# Patient Record
Sex: FEMALE | Race: WHITE | NOT HISPANIC OR LATINO
[De-identification: names, ages, dates, MRNs, and addresses within clinical notes are randomized per-mention and may not be internally consistent; named-entity substitution may affect disease eponyms.]

---

## 2017-05-25 ENCOUNTER — APPOINTMENT (OUTPATIENT)
Dept: MAMMOGRAPHY | Facility: IMAGING CENTER | Age: 47
End: 2017-05-25

## 2017-05-25 ENCOUNTER — APPOINTMENT (OUTPATIENT)
Dept: ULTRASOUND IMAGING | Facility: IMAGING CENTER | Age: 47
End: 2017-05-25

## 2017-05-25 ENCOUNTER — OUTPATIENT (OUTPATIENT)
Dept: OUTPATIENT SERVICES | Facility: HOSPITAL | Age: 47
LOS: 1 days | End: 2017-05-25
Payer: COMMERCIAL

## 2017-05-25 DIAGNOSIS — Z00.00 ENCOUNTER FOR GENERAL ADULT MEDICAL EXAMINATION WITHOUT ABNORMAL FINDINGS: ICD-10-CM

## 2017-05-25 PROCEDURE — 76641 ULTRASOUND BREAST COMPLETE: CPT

## 2017-05-25 PROCEDURE — 77063 BREAST TOMOSYNTHESIS BI: CPT

## 2017-05-25 PROCEDURE — 77067 SCR MAMMO BI INCL CAD: CPT

## 2018-05-31 ENCOUNTER — APPOINTMENT (OUTPATIENT)
Dept: ULTRASOUND IMAGING | Facility: IMAGING CENTER | Age: 48
End: 2018-05-31
Payer: COMMERCIAL

## 2018-05-31 ENCOUNTER — OUTPATIENT (OUTPATIENT)
Dept: OUTPATIENT SERVICES | Facility: HOSPITAL | Age: 48
LOS: 1 days | End: 2018-05-31
Payer: COMMERCIAL

## 2018-05-31 ENCOUNTER — APPOINTMENT (OUTPATIENT)
Dept: MAMMOGRAPHY | Facility: IMAGING CENTER | Age: 48
End: 2018-05-31
Payer: COMMERCIAL

## 2018-05-31 DIAGNOSIS — Z00.8 ENCOUNTER FOR OTHER GENERAL EXAMINATION: ICD-10-CM

## 2018-05-31 PROCEDURE — 77063 BREAST TOMOSYNTHESIS BI: CPT

## 2018-05-31 PROCEDURE — 77067 SCR MAMMO BI INCL CAD: CPT

## 2018-05-31 PROCEDURE — 77063 BREAST TOMOSYNTHESIS BI: CPT | Mod: 26

## 2018-05-31 PROCEDURE — 76641 ULTRASOUND BREAST COMPLETE: CPT

## 2018-05-31 PROCEDURE — 76641 ULTRASOUND BREAST COMPLETE: CPT | Mod: 26,50

## 2018-05-31 PROCEDURE — 77067 SCR MAMMO BI INCL CAD: CPT | Mod: 26

## 2019-06-06 ENCOUNTER — APPOINTMENT (OUTPATIENT)
Dept: ULTRASOUND IMAGING | Facility: IMAGING CENTER | Age: 49
End: 2019-06-06
Payer: COMMERCIAL

## 2019-06-06 ENCOUNTER — OUTPATIENT (OUTPATIENT)
Dept: OUTPATIENT SERVICES | Facility: HOSPITAL | Age: 49
LOS: 1 days | End: 2019-06-06
Payer: COMMERCIAL

## 2019-06-06 ENCOUNTER — APPOINTMENT (OUTPATIENT)
Dept: MAMMOGRAPHY | Facility: IMAGING CENTER | Age: 49
End: 2019-06-06
Payer: COMMERCIAL

## 2019-06-06 DIAGNOSIS — R92.8 OTHER ABNORMAL AND INCONCLUSIVE FINDINGS ON DIAGNOSTIC IMAGING OF BREAST: ICD-10-CM

## 2019-06-06 DIAGNOSIS — Z12.31 ENCOUNTER FOR SCREENING MAMMOGRAM FOR MALIGNANT NEOPLASM OF BREAST: ICD-10-CM

## 2019-06-06 PROCEDURE — 77063 BREAST TOMOSYNTHESIS BI: CPT | Mod: 26

## 2019-06-06 PROCEDURE — 77067 SCR MAMMO BI INCL CAD: CPT | Mod: 26

## 2019-06-06 PROCEDURE — 77063 BREAST TOMOSYNTHESIS BI: CPT

## 2019-06-06 PROCEDURE — 76641 ULTRASOUND BREAST COMPLETE: CPT | Mod: 26,50

## 2019-06-06 PROCEDURE — 76641 ULTRASOUND BREAST COMPLETE: CPT

## 2019-06-06 PROCEDURE — 77067 SCR MAMMO BI INCL CAD: CPT

## 2020-08-06 ENCOUNTER — APPOINTMENT (OUTPATIENT)
Dept: ULTRASOUND IMAGING | Facility: IMAGING CENTER | Age: 50
End: 2020-08-06
Payer: COMMERCIAL

## 2020-08-06 ENCOUNTER — APPOINTMENT (OUTPATIENT)
Dept: MAMMOGRAPHY | Facility: IMAGING CENTER | Age: 50
End: 2020-08-06
Payer: COMMERCIAL

## 2020-08-06 ENCOUNTER — OUTPATIENT (OUTPATIENT)
Dept: OUTPATIENT SERVICES | Facility: HOSPITAL | Age: 50
LOS: 1 days | End: 2020-08-06
Payer: COMMERCIAL

## 2020-08-06 DIAGNOSIS — Z12.31 ENCOUNTER FOR SCREENING MAMMOGRAM FOR MALIGNANT NEOPLASM OF BREAST: ICD-10-CM

## 2020-08-06 PROCEDURE — 76641 ULTRASOUND BREAST COMPLETE: CPT | Mod: 26,50

## 2020-08-06 PROCEDURE — 77063 BREAST TOMOSYNTHESIS BI: CPT | Mod: 26

## 2020-08-06 PROCEDURE — 77067 SCR MAMMO BI INCL CAD: CPT

## 2020-08-06 PROCEDURE — 77063 BREAST TOMOSYNTHESIS BI: CPT

## 2020-08-06 PROCEDURE — 77067 SCR MAMMO BI INCL CAD: CPT | Mod: 26

## 2020-08-06 PROCEDURE — 76641 ULTRASOUND BREAST COMPLETE: CPT

## 2021-08-24 ENCOUNTER — APPOINTMENT (OUTPATIENT)
Dept: GASTROENTEROLOGY | Facility: CLINIC | Age: 51
End: 2021-08-24
Payer: COMMERCIAL

## 2021-08-24 VITALS
BODY MASS INDEX: 18.25 KG/M2 | OXYGEN SATURATION: 98 % | HEIGHT: 63 IN | TEMPERATURE: 98 F | SYSTOLIC BLOOD PRESSURE: 100 MMHG | HEART RATE: 73 BPM | WEIGHT: 103 LBS | DIASTOLIC BLOOD PRESSURE: 60 MMHG

## 2021-08-24 DIAGNOSIS — Z00.00 ENCOUNTER FOR GENERAL ADULT MEDICAL EXAMINATION W/OUT ABNORMAL FINDINGS: ICD-10-CM

## 2021-08-24 PROCEDURE — 99203 OFFICE O/P NEW LOW 30 MIN: CPT

## 2021-08-24 RX ORDER — SODIUM PICOSULFATE, MAGNESIUM OXIDE, AND ANHYDROUS CITRIC ACID 10; 3.5; 12 MG/160ML; G/160ML; G/160ML
10-3.5-12 MG-GM LIQUID ORAL
Qty: 1 | Refills: 0 | Status: ACTIVE | COMMUNITY
Start: 2021-08-24 | End: 1900-01-01

## 2021-08-27 DIAGNOSIS — Z01.818 ENCOUNTER FOR OTHER PREPROCEDURAL EXAMINATION: ICD-10-CM

## 2021-08-28 ENCOUNTER — APPOINTMENT (OUTPATIENT)
Dept: DISASTER EMERGENCY | Facility: CLINIC | Age: 51
End: 2021-08-28

## 2021-08-30 LAB — SARS-COV-2 N GENE NPH QL NAA+PROBE: NOT DETECTED

## 2021-09-01 ENCOUNTER — LABORATORY RESULT (OUTPATIENT)
Age: 51
End: 2021-09-01

## 2021-09-01 ENCOUNTER — APPOINTMENT (OUTPATIENT)
Dept: GASTROENTEROLOGY | Facility: CLINIC | Age: 51
End: 2021-09-01
Payer: COMMERCIAL

## 2021-09-01 LAB
HCG UR QL: NEGATIVE
QUALITY CONTROL: YES

## 2021-09-01 PROCEDURE — 45380 COLONOSCOPY AND BIOPSY: CPT | Mod: 33

## 2021-09-01 PROCEDURE — 81025 URINE PREGNANCY TEST: CPT

## 2021-09-23 ENCOUNTER — APPOINTMENT (OUTPATIENT)
Dept: ULTRASOUND IMAGING | Facility: IMAGING CENTER | Age: 51
End: 2021-09-23
Payer: COMMERCIAL

## 2021-09-23 ENCOUNTER — OUTPATIENT (OUTPATIENT)
Dept: OUTPATIENT SERVICES | Facility: HOSPITAL | Age: 51
LOS: 1 days | End: 2021-09-23
Payer: COMMERCIAL

## 2021-09-23 ENCOUNTER — APPOINTMENT (OUTPATIENT)
Dept: MAMMOGRAPHY | Facility: IMAGING CENTER | Age: 51
End: 2021-09-23
Payer: COMMERCIAL

## 2021-09-23 DIAGNOSIS — Z00.8 ENCOUNTER FOR OTHER GENERAL EXAMINATION: ICD-10-CM

## 2021-09-23 DIAGNOSIS — Z12.31 ENCOUNTER FOR SCREENING MAMMOGRAM FOR MALIGNANT NEOPLASM OF BREAST: ICD-10-CM

## 2021-09-23 PROCEDURE — 77063 BREAST TOMOSYNTHESIS BI: CPT

## 2021-09-23 PROCEDURE — 76641 ULTRASOUND BREAST COMPLETE: CPT

## 2021-09-23 PROCEDURE — 77067 SCR MAMMO BI INCL CAD: CPT | Mod: 26

## 2021-09-23 PROCEDURE — 77063 BREAST TOMOSYNTHESIS BI: CPT | Mod: 26

## 2021-09-23 PROCEDURE — 76641 ULTRASOUND BREAST COMPLETE: CPT | Mod: 26,50

## 2021-09-23 PROCEDURE — 77067 SCR MAMMO BI INCL CAD: CPT

## 2022-04-07 ENCOUNTER — LABORATORY RESULT (OUTPATIENT)
Age: 52
End: 2022-04-07

## 2022-04-07 ENCOUNTER — NON-APPOINTMENT (OUTPATIENT)
Age: 52
End: 2022-04-07

## 2022-04-07 ENCOUNTER — APPOINTMENT (OUTPATIENT)
Dept: PULMONOLOGY | Facility: CLINIC | Age: 52
End: 2022-04-07
Payer: COMMERCIAL

## 2022-04-07 VITALS
HEIGHT: 63 IN | OXYGEN SATURATION: 98 % | RESPIRATION RATE: 16 BRPM | TEMPERATURE: 98.5 F | HEART RATE: 69 BPM | DIASTOLIC BLOOD PRESSURE: 69 MMHG | WEIGHT: 103 LBS | BODY MASS INDEX: 18.25 KG/M2 | SYSTOLIC BLOOD PRESSURE: 102 MMHG

## 2022-04-07 DIAGNOSIS — Z78.9 OTHER SPECIFIED HEALTH STATUS: ICD-10-CM

## 2022-04-07 DIAGNOSIS — Z11.59 ENCOUNTER FOR SCREENING FOR OTHER VIRAL DISEASES: ICD-10-CM

## 2022-04-07 LAB — POCT - HEMOGLOBIN (HGB), QUANTITATIVE, TRANSCUTANEOUS: 13.2

## 2022-04-07 PROCEDURE — 94729 DIFFUSING CAPACITY: CPT

## 2022-04-07 PROCEDURE — 94010 BREATHING CAPACITY TEST: CPT

## 2022-04-07 PROCEDURE — 36415 COLL VENOUS BLD VENIPUNCTURE: CPT

## 2022-04-07 PROCEDURE — ZZZZZ: CPT | Mod: 1L

## 2022-04-07 PROCEDURE — 94727 GAS DIL/WSHOT DETER LNG VOL: CPT

## 2022-04-07 PROCEDURE — 88738 HGB QUANT TRANSCUTANEOUS: CPT

## 2022-04-07 PROCEDURE — 99205 OFFICE O/P NEW HI 60 MIN: CPT | Mod: 25

## 2022-04-07 RX ORDER — ALBUTEROL SULFATE 90 UG/1
108 (90 BASE) INHALANT RESPIRATORY (INHALATION)
Qty: 1 | Refills: 1 | Status: ACTIVE | COMMUNITY
Start: 2022-04-07 | End: 1900-01-01

## 2022-04-07 NOTE — HISTORY OF PRESENT ILLNESS
[Never] : never [TextBox_4] : 51-year-old female pulmonary evaluation\par Referral to ENT\par Chief complaint 2 to 3 weeks of chronic persistent cough\par Was seen by ENT told that she had an abnormal lung exam refer for chest x-ray which officially reported hyper aeration\par Was treated with a Z-Mikael and Flonase\par \par Cough has persisted\par She states the cough is most noted when she is talking or when she is exhaling and breathing\par There is associated cough related to cold weather symptomatology as well\par She does not report any nocturnal symptomatology or awakening with cough\par States her son was sick several weeks ago with cold-like symptoms but reports home rapid antigen Covid testing all negative\par Patient states she works in [employer] and has had rapid Covid 2 times per week and they remain negative as well\par She has received COVID vaccine protocol\par She also states in the past having a cough that would linger and persist\par She does not report any wheezing\par Cough is mostly dry she does not report any associated sputum production hemoptysis purulent sputum or foul-smelling sputum\par She is not complaining of any exertional dyspnea\par She states as a child she has had episodes of bronchitis\par Does not report history of eczema\par History of chemical toxic inhalational exposures\par Non-smoker\par

## 2022-04-07 NOTE — PROCEDURE
[FreeTextEntry1] : PFT no bronchodilator April 7, 2022\par Flow rates are normal\par FEV1 FVC 77 borderline\par Lung vitals are normal\par % predicted subtle measurement for increased hyper aeration air trapping\par RV/total lung capacity 80 normal range\par Diffusion normal 90% predicted\par Hemoglobin 13.2\par \par [facility] April 1, 2022 hyper aeration of the lungs\par \par blood draw

## 2022-04-07 NOTE — PHYSICAL EXAM
[Normal Oropharynx] : normal oropharynx [I] : Mallampati Class: I [Normal Appearance] : normal appearance [Supple] : supple [No Neck Mass] : no neck mass [No JVD] : no jvd [Normal Rate/Rhythm] : normal rate/rhythm [Normal S1, S2] : normal s1, s2 [No Murmurs] : no murmurs [No Resp Distress] : no resp distress [No Acc Muscle Use] : no acc muscle use [Normal Palpation] : normal palpation [Normal Rhythm and Effort] : normal rhythm and effort [Clear to Auscultation Bilaterally] : clear to auscultation bilaterally [No Abnormalities] : no abnormalities [Benign] : benign [Not Tender] : not tender [Soft] : soft [No HSM] : no hsm [Normal Bowel Sounds] : normal bowel sounds [Normal Gait] : normal gait [No Clubbing] : no clubbing [No Cyanosis] : no cyanosis [No Edema] : no edema [FROM] : FROM [No Acute Distress] : no acute distress [TextBox_68] : Positive talking with cough

## 2022-04-07 NOTE — DISCUSSION/SUMMARY
[FreeTextEntry1] : Subacute cough\par Rule out post viral bronchitic syndrome\par Rule out viral etiology\par Hyperaeration of the lungs other symptomatology cough and exhalation cold weather symptomatology suggestive of inflammatory reaction active airway disease rule out cough variant asthma\par Recommendations\par Asthma profile\par CBC check eosinophils\par BREO 200 mcg 1 puff and Rinse\par prn KENNETH  \par medrol dose florence\par

## 2022-04-08 ENCOUNTER — NON-APPOINTMENT (OUTPATIENT)
Age: 52
End: 2022-04-08

## 2022-04-08 LAB
BASOPHILS # BLD AUTO: 0.06 K/UL
BASOPHILS NFR BLD AUTO: 0.7 %
COVID-19 NUCLEOCAPSID  GAM ANTIBODY INTERPRETATION: NEGATIVE
EOSINOPHIL # BLD AUTO: 0.1 K/UL
EOSINOPHIL NFR BLD AUTO: 1.2 %
HCT VFR BLD CALC: 40.5 %
HGB BLD-MCNC: 13.2 G/DL
IMM GRANULOCYTES NFR BLD AUTO: 0.4 %
LYMPHOCYTES # BLD AUTO: 2.22 K/UL
LYMPHOCYTES NFR BLD AUTO: 26.4 %
MAN DIFF?: NORMAL
MCHC RBC-ENTMCNC: 31.5 PG
MCHC RBC-ENTMCNC: 32.6 GM/DL
MCV RBC AUTO: 96.7 FL
MONOCYTES # BLD AUTO: 0.58 K/UL
MONOCYTES NFR BLD AUTO: 6.9 %
NEUTROPHILS # BLD AUTO: 5.41 K/UL
NEUTROPHILS NFR BLD AUTO: 64.4 %
PLATELET # BLD AUTO: 308 K/UL
RBC # BLD: 4.19 M/UL
RBC # FLD: 12.9 %
SARS-COV-2 AB SERPL QL IA: 0.07 INDEX
WBC # FLD AUTO: 8.4 K/UL

## 2022-04-11 PROBLEM — Z11.59 SCREENING FOR VIRAL DISEASE: Status: ACTIVE | Noted: 2022-04-07

## 2022-04-11 LAB
A ALTERNATA IGE QN: <0.1 KUA/L
A FUMIGATUS IGE QN: <0.1 KUA/L
C ALBICANS IGE QN: <0.1 KUA/L
C HERBARUM IGE QN: <0.1 KUA/L
CAT DANDER IGE QN: <0.1 KUA/L
CLAM IGE QN: <0.1 KUA/L
CODFISH IGE QN: <0.1 KUA/L
COMMON RAGWEED IGE QN: <0.1 KUA/L
CORN IGE QN: <0.1 KUA/L
COW MILK IGE QN: <0.1 KUA/L
D FARINAE IGE QN: <0.1 KUA/L
D PTERONYSS IGE QN: <0.1 KUA/L
DEPRECATED A ALTERNATA IGE RAST QL: 0
DEPRECATED A FUMIGATUS IGE RAST QL: 0
DEPRECATED C ALBICANS IGE RAST QL: 0
DEPRECATED C HERBARUM IGE RAST QL: 0
DEPRECATED CAT DANDER IGE RAST QL: 0
DEPRECATED CLAM IGE RAST QL: 0
DEPRECATED CODFISH IGE RAST QL: 0
DEPRECATED COMMON RAGWEED IGE RAST QL: 0
DEPRECATED CORN IGE RAST QL: 0
DEPRECATED COW MILK IGE RAST QL: 0
DEPRECATED D FARINAE IGE RAST QL: 0
DEPRECATED D PTERONYSS IGE RAST QL: 0
DEPRECATED DOG DANDER IGE RAST QL: 0
DEPRECATED EGG WHITE IGE RAST QL: 0
DEPRECATED M RACEMOSUS IGE RAST QL: 0
DEPRECATED PEANUT IGE RAST QL: 0
DEPRECATED ROACH IGE RAST QL: 0
DEPRECATED SCALLOP IGE RAST QL: <0.1 KUA/L
DEPRECATED SESAME SEED IGE RAST QL: 0
DEPRECATED SHRIMP IGE RAST QL: 0
DEPRECATED SOYBEAN IGE RAST QL: 0
DEPRECATED TIMOTHY IGE RAST QL: 0
DEPRECATED WALNUT IGE RAST QL: 0
DEPRECATED WHEAT IGE RAST QL: 0
DEPRECATED WHITE OAK IGE RAST QL: 0
DOG DANDER IGE QN: <0.1 KUA/L
EGG WHITE IGE QN: <0.1 KUA/L
M RACEMOSUS IGE QN: <0.1 KUA/L
PEANUT IGE QN: <0.1 KUA/L
ROACH IGE QN: <0.1 KUA/L
SCALLOP IGE QN: 0
SCALLOP IGE QN: <0.1 KUA/L
SESAME SEED IGE QN: <0.1 KUA/L
SOYBEAN IGE QN: <0.1 KUA/L
TIMOTHY IGE QN: <0.1 KUA/L
WALNUT IGE QN: <0.1 KUA/L
WHEAT IGE QN: <0.1 KUA/L
WHITE OAK IGE QN: <0.1 KUA/L

## 2022-05-27 ENCOUNTER — APPOINTMENT (OUTPATIENT)
Dept: PULMONOLOGY | Facility: CLINIC | Age: 52
End: 2022-05-27
Payer: COMMERCIAL

## 2022-05-27 VITALS
DIASTOLIC BLOOD PRESSURE: 67 MMHG | SYSTOLIC BLOOD PRESSURE: 107 MMHG | OXYGEN SATURATION: 98 % | TEMPERATURE: 98.2 F | HEART RATE: 68 BPM

## 2022-05-27 PROCEDURE — 99214 OFFICE O/P EST MOD 30 MIN: CPT

## 2022-05-27 RX ORDER — METHYLPREDNISOLONE 4 MG/1
4 TABLET ORAL
Qty: 1 | Refills: 0 | Status: DISCONTINUED | COMMUNITY
Start: 2022-04-07 | End: 2022-05-27

## 2022-05-27 NOTE — PHYSICAL EXAM
[No Acute Distress] : no acute distress [Normal Oropharynx] : normal oropharynx [I] : Mallampati Class: I [Normal Appearance] : normal appearance [Supple] : supple [No Neck Mass] : no neck mass [No JVD] : no jvd [Normal Rate/Rhythm] : normal rate/rhythm [Normal S1, S2] : normal s1, s2 [No Murmurs] : no murmurs [No Resp Distress] : no resp distress [No Acc Muscle Use] : no acc muscle use [Normal Palpation] : normal palpation [Normal Rhythm and Effort] : normal rhythm and effort [Clear to Auscultation Bilaterally] : clear to auscultation bilaterally [No Abnormalities] : no abnormalities [Benign] : benign [Not Tender] : not tender [Soft] : soft [No HSM] : no hsm [Normal Bowel Sounds] : normal bowel sounds [Normal Gait] : normal gait [No Clubbing] : no clubbing [No Cyanosis] : no cyanosis [No Edema] : no edema [FROM] : FROM [TextBox_68] : Positive talking with cough

## 2022-05-27 NOTE — PROCEDURE
[FreeTextEntry1] : PFT no bronchodilator April 7, 2022\par Flow rates are normal\par FEV1 FVC 77 borderline\par Lung vitals are normal\par % predicted subtle measurement for increased hyper aeration air trapping\par RV/total lung capacity 80 normal range\par Diffusion normal 90% predicted\par Hemoglobin 13.2\par \par [facility] April 1, 2022 hyper aeration of the lungs\par \par blood draw

## 2022-05-27 NOTE — HISTORY OF PRESENT ILLNESS
[Never] : never [TextBox_4] : 51-year-old female pulmonary evaluation\par Referral to ENT\par Chief complaint 2 to 3 weeks of chronic persistent cough- resolved  post tx steroids\par Was seen by ENT told that she had an abnormal lung exam refer for chest x-ray which officially reported hyper aeration\par Was treated with a Z-Mikael and Flonase\par \par Cough has persisted as noted better\par She states the cough is most noted when she is talking or when she is exhaling and breathing\par There is associated cough related to cold weather symptomatology as well\par She does not report any nocturnal symptomatology or awakening with cough\par States her son was sick several weeks ago with cold-like symptoms but reports home rapid antigen Covid testing all negative\par Patient states she works in [employer] and has had rapid Covid 2 times per week and they remain negative as well\par She has received COVID vaccine protocol\par She also states in the past having a cough that would linger and persist\par She does not report any wheezing\par Cough is mostly dry she does not report any associated sputum production hemoptysis purulent sputum or foul-smelling sputum\par She is not complaining of any exertional dyspnea\par She states as a child she has had episodes of bronchitis\par Does not report history of eczema\par History of chemical toxic inhalational exposures\par Non-smoker\par

## 2022-05-27 NOTE — DISCUSSION/SUMMARY
[FreeTextEntry1] : Subacute cough resolved\par Rule out post viral bronchitic syndrome\par Rule out viral etiology\par Hyperaeration of the lungs other symptomatology cough and exhalation cold weather symptomatology suggestive of inflammatory reaction active airway disease rule out cough variant asthma\par Recommendations\par Asthma profile\par CBC  eosinophils noted\par BREO 200 mcg 1 puff and Rinse OFF\par prn KENNETH  \par medrol dose [place] completed\par

## 2022-06-20 ENCOUNTER — APPOINTMENT (OUTPATIENT)
Dept: PULMONOLOGY | Facility: CLINIC | Age: 52
End: 2022-06-20
Payer: COMMERCIAL

## 2022-06-20 DIAGNOSIS — R09.82 POSTNASAL DRIP: ICD-10-CM

## 2022-06-20 PROCEDURE — 99443: CPT

## 2022-06-20 RX ORDER — FLUTICASONE PROPIONATE 50 UG/1
50 SPRAY, METERED NASAL
Qty: 1 | Refills: 3 | Status: ACTIVE | COMMUNITY
Start: 2022-06-20 | End: 1900-01-01

## 2022-06-20 RX ORDER — AZITHROMYCIN 250 MG/1
250 TABLET, FILM COATED ORAL
Qty: 1 | Refills: 0 | Status: ACTIVE | COMMUNITY
Start: 2022-06-20 | End: 1900-01-01

## 2022-06-20 RX ORDER — PREDNISONE 10 MG/1
10 TABLET ORAL
Qty: 12 | Refills: 0 | Status: ACTIVE | COMMUNITY
Start: 2022-06-20 | End: 1900-01-01

## 2022-07-08 ENCOUNTER — APPOINTMENT (OUTPATIENT)
Dept: PULMONOLOGY | Facility: CLINIC | Age: 52
End: 2022-07-08

## 2022-07-08 VITALS
TEMPERATURE: 98.5 F | DIASTOLIC BLOOD PRESSURE: 73 MMHG | HEART RATE: 92 BPM | OXYGEN SATURATION: 96 % | SYSTOLIC BLOOD PRESSURE: 108 MMHG | BODY MASS INDEX: 18.61 KG/M2 | HEIGHT: 63 IN | WEIGHT: 105 LBS

## 2022-07-08 DIAGNOSIS — U07.1 COVID-19: ICD-10-CM

## 2022-07-08 PROCEDURE — 94729 DIFFUSING CAPACITY: CPT

## 2022-07-08 PROCEDURE — 94727 GAS DIL/WSHOT DETER LNG VOL: CPT

## 2022-07-08 PROCEDURE — 94010 BREATHING CAPACITY TEST: CPT

## 2022-07-08 PROCEDURE — 71046 X-RAY EXAM CHEST 2 VIEWS: CPT

## 2022-07-08 PROCEDURE — 99214 OFFICE O/P EST MOD 30 MIN: CPT | Mod: 25

## 2022-07-08 RX ORDER — METRONIDAZOLE 7.5 MG/G
0.75 CREAM TOPICAL
Qty: 45 | Refills: 0 | Status: DISCONTINUED | COMMUNITY
Start: 2022-02-26

## 2022-07-08 RX ORDER — BENZONATATE 100 MG/1
100 CAPSULE ORAL
Qty: 20 | Refills: 0 | Status: DISCONTINUED | COMMUNITY
Start: 2022-06-10

## 2022-07-08 NOTE — DISCUSSION/SUMMARY
[FreeTextEntry1] : COVID infection  late May 2022\par Mild decline of pulmonary [id number] 3 months\par Subacute cough resolved\par Rule out post viral bronchitic syndrome\par Rule out viral etiology\par Hyperaeration of the lungs other symptomatology cough and exhalation cold weather symptomatology suggestive of inflammatory reaction active airway disease rule out cough variant asthma\par Recommendations\par Asthma profile\par CBC  eosinophils noted\par BREO 200 mcg 1 puff and Rinse OFF\par prn KENNETH  \par

## 2022-07-08 NOTE — PROCEDURE
[FreeTextEntry1] : PFT Jul. 8/2022\par Post COVID-19 infection\par Flow rates normal\par Lung volumes normal\par TLC 91% predicted\par Diffusion 81% predicted\par Data comparison to April 7, 2022 does demonstrate some decline in the FVC and mild decline at diffusion as well as lung volumes\par Clinical correlation\par \par Pulmonary 6-minute walk exercise study July 8, 2022\par Baseline room air O2 saturation 95%\par Impression normal study\par No room air desaturation\par \par Chest x-ray PA lateral July 8, 2022\par Normal cardiac size\par Lung fields clear\par Some splaying of the ribs with some increased retrosternal airspace cannot exclude hyperaeration\par Otherwise lungs are clear without parenchymal infiltrate pleural effusions dominant pulmonary nodules\par Noted correlation with PFT does not demonstrate an obstructive ventilatory impairment\par Mild scoliosis\par PFT no bronchodilator April 7, 2022\par Flow rates are normal\par FEV1 FVC 77 borderline\par Lung vitals are normal\par % predicted subtle measurement for increased hyper aeration air trapping\par RV/total lung capacity 80 normal range\par Diffusion normal 90% predicted\par Hemoglobin 13.2\par \par [facility], [facility] hyper aeration of the lungs\par

## 2022-07-08 NOTE — HISTORY OF PRESENT ILLNESS
[Never] : never [TextBox_4] : 51-year-old female pulmonary evaluation\par Post COVID memorial day weekend\par not txed Paxlovid\par Did have a return of cough which is now subsequently subsided\par No GERD symptoms\par Weight stable appetite normal\par No fevers chills or sweats\par No reported purulent sputum\par No exertional dyspnea chest pain chest tightness or pleuritic chest pain\par \par Referral to ENT\par Chief complaint 2 to 3 weeks of chronic persistent cough- resolved  post tx steroids\par Was seen by ENT told that she had an abnormal lung exam refer for chest x-ray which officially reported hyper aeration\par Was treated with a Z-Mikael and Flonase\par \par Cough has persisted as noted better\par She states the cough is most noted when she is talking or when she is exhaling and breathing\par There is associated cough related to cold weather symptomatology as well\par She does not report any nocturnal symptomatology or awakening with cough\par States her son was sick several weeks ago with cold-like symptoms but reports home rapid antigen Covid testing all negative\par Patient states she works in [employer] [employer] [facility] and has had rapid Covid 2 times per week and they remain negative as well\par She has received COVID vaccine protocol\par She also states in the past having a cough that would linger and persist\par She does not report any wheezing\par Cough is mostly dry she does not report any associated sputum production hemoptysis purulent sputum or foul-smelling sputum\par She is not complaining of any exertional dyspnea\par She states as a child she has had episodes of bronchitis\par Does not report history of eczema\par History of chemical toxic inhalational exposures\par Non-smoker\par

## 2022-10-31 ENCOUNTER — OUTPATIENT (OUTPATIENT)
Dept: OUTPATIENT SERVICES | Facility: HOSPITAL | Age: 52
LOS: 1 days | End: 2022-10-31
Payer: COMMERCIAL

## 2022-10-31 ENCOUNTER — APPOINTMENT (OUTPATIENT)
Dept: ULTRASOUND IMAGING | Facility: IMAGING CENTER | Age: 52
End: 2022-10-31

## 2022-10-31 ENCOUNTER — APPOINTMENT (OUTPATIENT)
Dept: MAMMOGRAPHY | Facility: IMAGING CENTER | Age: 52
End: 2022-10-31

## 2022-10-31 DIAGNOSIS — Z00.8 ENCOUNTER FOR OTHER GENERAL EXAMINATION: ICD-10-CM

## 2022-10-31 PROCEDURE — 77067 SCR MAMMO BI INCL CAD: CPT | Mod: 26

## 2022-10-31 PROCEDURE — 77067 SCR MAMMO BI INCL CAD: CPT

## 2022-10-31 PROCEDURE — 76641 ULTRASOUND BREAST COMPLETE: CPT | Mod: 26,50

## 2022-10-31 PROCEDURE — 76641 ULTRASOUND BREAST COMPLETE: CPT

## 2022-10-31 PROCEDURE — 77063 BREAST TOMOSYNTHESIS BI: CPT | Mod: 26

## 2022-10-31 PROCEDURE — 77063 BREAST TOMOSYNTHESIS BI: CPT

## 2023-01-10 ENCOUNTER — APPOINTMENT (OUTPATIENT)
Dept: RADIOLOGY | Facility: CLINIC | Age: 53
End: 2023-01-10
Payer: COMMERCIAL

## 2023-01-10 ENCOUNTER — OUTPATIENT (OUTPATIENT)
Dept: OUTPATIENT SERVICES | Facility: HOSPITAL | Age: 53
LOS: 1 days | End: 2023-01-10
Payer: COMMERCIAL

## 2023-01-10 DIAGNOSIS — Z00.8 ENCOUNTER FOR OTHER GENERAL EXAMINATION: ICD-10-CM

## 2023-01-10 PROCEDURE — 77080 DXA BONE DENSITY AXIAL: CPT | Mod: 26

## 2023-01-10 PROCEDURE — 77080 DXA BONE DENSITY AXIAL: CPT

## 2023-05-05 ENCOUNTER — APPOINTMENT (OUTPATIENT)
Dept: PULMONOLOGY | Facility: CLINIC | Age: 53
End: 2023-05-05
Payer: COMMERCIAL

## 2023-05-05 VITALS
BODY MASS INDEX: 18.6 KG/M2 | OXYGEN SATURATION: 98 % | TEMPERATURE: 98.3 F | DIASTOLIC BLOOD PRESSURE: 68 MMHG | SYSTOLIC BLOOD PRESSURE: 101 MMHG | HEART RATE: 70 BPM | WEIGHT: 105 LBS

## 2023-05-05 LAB — POCT - HEMOGLOBIN (HGB), QUANTITATIVE, TRANSCUTANEOUS: 12.6

## 2023-05-05 PROCEDURE — 94729 DIFFUSING CAPACITY: CPT

## 2023-05-05 PROCEDURE — ZZZZZ: CPT

## 2023-05-05 PROCEDURE — 99214 OFFICE O/P EST MOD 30 MIN: CPT | Mod: 25

## 2023-05-05 PROCEDURE — 71046 X-RAY EXAM CHEST 2 VIEWS: CPT

## 2023-05-05 PROCEDURE — 94060 EVALUATION OF WHEEZING: CPT

## 2023-05-05 PROCEDURE — 94727 GAS DIL/WSHOT DETER LNG VOL: CPT

## 2023-05-05 PROCEDURE — 95012 NITRIC OXIDE EXP GAS DETER: CPT

## 2023-05-05 PROCEDURE — 88738 HGB QUANT TRANSCUTANEOUS: CPT

## 2023-05-05 RX ORDER — BENZONATATE 100 MG/1
100 CAPSULE ORAL
Qty: 30 | Refills: 2 | Status: ACTIVE | COMMUNITY
Start: 2023-05-05 | End: 1900-01-01

## 2023-05-05 RX ORDER — AZITHROMYCIN 250 MG/1
250 TABLET, FILM COATED ORAL
Qty: 1 | Refills: 0 | Status: ACTIVE | COMMUNITY
Start: 2023-05-05 | End: 1900-01-01

## 2023-05-05 RX ORDER — METHYLPREDNISOLONE 4 MG/1
4 TABLET ORAL
Qty: 1 | Refills: 0 | Status: ACTIVE | COMMUNITY
Start: 2023-05-05 | End: 1900-01-01

## 2023-05-05 NOTE — DISCUSSION/SUMMARY
[FreeTextEntry1] : Recurrence of cough with pulmonary spirometry mild obstructive ventilatory impairment\par Rule out post viral bronchitic syndrome\par Hyperaeration of the lungs other symptomatology cough and exhalation cold weather symptomatology suggestive of inflammatory reaction active airway disease rule out cough variant asthma\par COVID infection  late May 2022\par Recommendations\par Asthma profile done prior  negative\par CBC  prior no eosinophils noted\par BREO 200 mcg 1 puff and Rinse OFF\par prn KENNETH  \par Z-Mikael\par Medrol Dosepak\par As needed rescue inhaler\par Notify if no interval improvement\par

## 2023-05-05 NOTE — HISTORY OF PRESENT ILLNESS
[Never] : never [TextBox_4] : 51-year-old female pulmonary evaluation\par past couple weeks inc pos  cough \par triggers include exhaling and talking \par gets fits that can pass\par  sputum neg\par  no chest congestion\par  states does not  really feel  sick\par \par Post COVID memorial day weekend 2022\par not txed Paxlovid\par Did have a return of cough which is now subsequently subsided\par No GERD symptoms\par Weight stable appetite normal\par No fevers chills or sweats\par No reported purulent sputum\par No exertional dyspnea chest pain chest tightness or pleuritic chest pain\par \par Referral to ENT\par Chief complaint 2 to 3 weeks of chronic persistent cough- resolved  post tx steroids\par Was seen by ENT told that she had an abnormal lung exam refer for chest x-ray which officially reported hyper aeration\par Was treated with a Z-Mikael and Flonase\par \par Cough has persisted as noted better\par She states the cough is most noted when she is talking or when she is exhaling and breathing\par There is associated cough related to cold weather symptomatology as well\par She does not report any nocturnal symptomatology or awakening with cough\par States her son was sick several weeks ago with cold-like symptoms but reports home rapid antigen Covid testing all negative\par Patient states she works in [employer] and has had rapid Covid 2 times per week and they remain negative as well\par She has received COVID vaccine protocol\par She also states in the past having a cough that would linger and persist\par She does not report any wheezing\par Cough is mostly dry she does not report any associated sputum production hemoptysis purulent sputum or foul-smelling sputum\par She is not complaining of any exertional dyspnea\par She states as a child she has had episodes of bronchitis\par Does not report history of eczema\par History of chemical toxic inhalational exposures\par Non-smoker\par

## 2023-05-05 NOTE — PROCEDURE
[FreeTextEntry1] : Chest x-ray PA lateral May 5, 2023\par Normal cardiac size\par Mild scoliosis\par Lung fields are grossly clear without parenchymal infiltrates pleural effusions dominant pulmonary nodules\par Soft tissue bony structures multiple\par Mediastinum unremarkable\par Clear lungs\par \par PFT May 5, 2023\par Well-preserved flow rates\par FEV1 FVC ratio 73\par Mild obstructive ventilatory impairment\par Borderline 9% response to bronchodilator at the FEV1\par Lung dimes normal\par TLC 92% predicted\par No air-trapping\par Diffusion normal 83% predicted\par Hemoglobin 12.6\par Comparison dating back to July 8, 2022 demonstrates a greater than 200 cc decline at the FEV1\par Impression overall very mild obstructive ventilatory impairment r\par \par \par PFT Jul. 8/2022\par Post COVID-19 infection\par Flow rates normal\par Lung volumes normal\par TLC 91% predicted\par Diffusion 81% predicted\par Data comparison to April 7, 2022 does demonstrate some decline in the FVC and mild decline at diffusion as well as lung volumes\par Clinical correlation\par \par Pulmonary 6-minute walk exercise study July 8, 2022\par Baseline room air O2 saturation 95%\par Impression normal study\par No room air desaturation\par \par Chest x-ray PA lateral July 8, 2022\par Normal cardiac size\par Lung fields clear\par Some splaying of the ribs with some increased retrosternal airspace cannot exclude hyperaeration\par Otherwise lungs are clear without parenchymal infiltrate pleural effusions dominant pulmonary nodules\par Noted correlation with PFT does not demonstrate an obstructive ventilatory impairment\par Mild scoliosis\par PFT no bronchodilator April 7, 2022\par Flow rates are normal\par FEV1 FVC 77 borderline\par Lung vitals are normal\par % predicted subtle measurement for increased hyper aeration air trapping\par RV/total lung capacity 80 normal range\par Diffusion normal 90% predicted\par Hemoglobin 13.2\par \par [facility] radiology April 1, 2022 hyper aeration of the lungs\par

## 2023-05-11 ENCOUNTER — NON-APPOINTMENT (OUTPATIENT)
Age: 53
End: 2023-05-11

## 2023-05-11 RX ORDER — MONTELUKAST 10 MG/1
10 TABLET, FILM COATED ORAL
Qty: 30 | Refills: 3 | Status: ACTIVE | COMMUNITY
Start: 2023-05-11 | End: 1900-01-01

## 2023-05-11 RX ORDER — FLUTICASONE FUROATE AND VILANTEROL TRIFENATATE 200; 25 UG/1; UG/1
200-25 POWDER RESPIRATORY (INHALATION) DAILY
Qty: 1 | Refills: 3 | Status: ACTIVE | COMMUNITY
Start: 2023-05-11 | End: 1900-01-01

## 2023-05-23 ENCOUNTER — APPOINTMENT (OUTPATIENT)
Dept: PULMONOLOGY | Facility: CLINIC | Age: 53
End: 2023-05-23
Payer: COMMERCIAL

## 2023-05-23 DIAGNOSIS — R05.3 CHRONIC COUGH: ICD-10-CM

## 2023-05-23 DIAGNOSIS — J20.8 ACUTE BRONCHITIS DUE TO OTHER SPECIFIED ORGANISMS: ICD-10-CM

## 2023-05-23 PROCEDURE — 99213 OFFICE O/P EST LOW 20 MIN: CPT | Mod: 95

## 2023-05-23 RX ORDER — FLUTICASONE FUROATE, UMECLIDINIUM BROMIDE AND VILANTEROL TRIFENATATE 200; 62.5; 25 UG/1; UG/1; UG/1
200-62.5-25 POWDER RESPIRATORY (INHALATION)
Qty: 60 | Refills: 3 | Status: ACTIVE | COMMUNITY
Start: 2023-05-23 | End: 1900-01-01

## 2023-05-23 NOTE — REASON FOR VISIT
[Home] : at home, [unfilled] , at the time of the visit. [Medical Office: ([facility])___] : at the medical office located in  [Follow-Up] : a follow-up visit [Asthma] : asthma [Cough] : cough

## 2023-05-23 NOTE — HISTORY OF PRESENT ILLNESS
[Never] : never [TextBox_4] : We will restart Breo and montelukast understanding that as controller therapy is may take still some try to reduce the severity of cough iwith the inflammatory response \par slighlty  better \par still with some  sxs

## 2023-05-23 NOTE — PROCEDURE
[FreeTextEntry1] : Chest x-ray PA lateral May 5, 2023\par Normal cardiac size\par Mild scoliosis\par Lung fields are grossly clear without parenchymal infiltrates pleural effusions dominant pulmonary nodules\par Soft tissue bony structures multiple\par Mediastinum unremarkable\par Clear lungs\par \par PFT May 5, 2023\par Well-preserved flow rates\par FEV1 FVC ratio 73\par Mild obstructive ventilatory impairment\par Borderline 9% response to bronchodilator at the FEV1\par Lung dimes normal\par TLC 92% predicted\par No air-trapping\par Diffusion normal 83% predicted\par Hemoglobin 12.6\par Comparison dating back to July 8, 2022 demonstrates a greater than 200 cc decline at the FEV1\par Impression overall very mild obstructive ventilatory impairment r\par \par \par PFT Jul. 8/2022\par Post COVID-19 infection\par Flow rates normal\par Lung volumes normal\par TLC 91% predicted\par Diffusion 81% predicted\par Data comparison to April 7, 2022 does demonstrate some decline in the FVC and mild decline at diffusion as well as lung volumes\par Clinical correlation\par \par Pulmonary 6-minute walk exercise study July 8, 2022\par Baseline room air O2 saturation 95%\par Impression normal study\par No room air desaturation\par \par Chest x-ray PA lateral July 8, 2022\par Normal cardiac size\par Lung fields clear\par Some splaying of the ribs with some increased retrosternal airspace cannot exclude hyperaeration\par Otherwise lungs are clear without parenchymal infiltrate pleural effusions dominant pulmonary nodules\par Noted correlation with PFT does not demonstrate an obstructive ventilatory impairment\par Mild scoliosis\par PFT no bronchodilator April 7, 2022\par Flow rates are normal\par FEV1 FVC 77 borderline\par Lung vitals are normal\par % predicted subtle measurement for increased hyper aeration air trapping\par RV/total lung capacity 80 normal range\par Diffusion normal 90% predicted\par Hemoglobin 13.2\par \par [facility] radiology April 1, 2022 hyper aeration of the lungs\par

## 2023-05-23 NOTE — DISCUSSION/SUMMARY
[FreeTextEntry1] : Subacute cough  better\par Rule out post viral bronchitic syndrome\par Rule out viral etiology\par OAD\par Hyperaeration of the lungs other symptomatology cough and exhalation cold weather symptomatology suggestive of inflammatory reaction active airway disease rule out cough variant asthma\par Recommendations\par Asthma profile\par CBC  eosinophils noted\par BREO 200 mcg 1 puff and Rinse \par continue  singulair \par Change to Trelegy elliptica 200 mcg dose  1 puff QD \par prn KENNETH  \par medrol dose fl[place] completed\par

## 2023-10-31 ENCOUNTER — APPOINTMENT (OUTPATIENT)
Dept: MAMMOGRAPHY | Facility: IMAGING CENTER | Age: 53
End: 2023-10-31
Payer: COMMERCIAL

## 2023-10-31 ENCOUNTER — APPOINTMENT (OUTPATIENT)
Dept: ULTRASOUND IMAGING | Facility: IMAGING CENTER | Age: 53
End: 2023-10-31
Payer: COMMERCIAL

## 2023-10-31 ENCOUNTER — OUTPATIENT (OUTPATIENT)
Dept: OUTPATIENT SERVICES | Facility: HOSPITAL | Age: 53
LOS: 1 days | End: 2023-10-31
Payer: COMMERCIAL

## 2023-10-31 DIAGNOSIS — Z12.31 ENCOUNTER FOR SCREENING MAMMOGRAM FOR MALIGNANT NEOPLASM OF BREAST: ICD-10-CM

## 2023-10-31 DIAGNOSIS — R92.2 INCONCLUSIVE MAMMOGRAM: ICD-10-CM

## 2023-10-31 PROCEDURE — 77063 BREAST TOMOSYNTHESIS BI: CPT | Mod: 26

## 2023-10-31 PROCEDURE — 77067 SCR MAMMO BI INCL CAD: CPT | Mod: 26

## 2023-10-31 PROCEDURE — 77067 SCR MAMMO BI INCL CAD: CPT

## 2023-10-31 PROCEDURE — 77063 BREAST TOMOSYNTHESIS BI: CPT

## 2023-10-31 PROCEDURE — 76641 ULTRASOUND BREAST COMPLETE: CPT

## 2023-10-31 PROCEDURE — 76641 ULTRASOUND BREAST COMPLETE: CPT | Mod: 26,50

## 2024-10-31 ENCOUNTER — OUTPATIENT (OUTPATIENT)
Dept: OUTPATIENT SERVICES | Facility: HOSPITAL | Age: 54
LOS: 1 days | End: 2024-10-31
Payer: COMMERCIAL

## 2024-10-31 ENCOUNTER — APPOINTMENT (OUTPATIENT)
Dept: MAMMOGRAPHY | Facility: IMAGING CENTER | Age: 54
End: 2024-10-31
Payer: COMMERCIAL

## 2024-10-31 ENCOUNTER — APPOINTMENT (OUTPATIENT)
Dept: ULTRASOUND IMAGING | Facility: IMAGING CENTER | Age: 54
End: 2024-10-31
Payer: COMMERCIAL

## 2024-10-31 DIAGNOSIS — Z12.31 ENCOUNTER FOR SCREENING MAMMOGRAM FOR MALIGNANT NEOPLASM OF BREAST: ICD-10-CM

## 2024-10-31 DIAGNOSIS — Z00.8 ENCOUNTER FOR OTHER GENERAL EXAMINATION: ICD-10-CM

## 2024-10-31 PROCEDURE — 77063 BREAST TOMOSYNTHESIS BI: CPT

## 2024-10-31 PROCEDURE — 76641 ULTRASOUND BREAST COMPLETE: CPT

## 2024-10-31 PROCEDURE — 77067 SCR MAMMO BI INCL CAD: CPT

## 2024-10-31 PROCEDURE — 77067 SCR MAMMO BI INCL CAD: CPT | Mod: 26

## 2024-10-31 PROCEDURE — 76641 ULTRASOUND BREAST COMPLETE: CPT | Mod: 26,50

## 2024-10-31 PROCEDURE — 77063 BREAST TOMOSYNTHESIS BI: CPT | Mod: 26
